# Patient Record
Sex: FEMALE
[De-identification: names, ages, dates, MRNs, and addresses within clinical notes are randomized per-mention and may not be internally consistent; named-entity substitution may affect disease eponyms.]

---

## 2020-01-27 ENCOUNTER — HOSPITAL ENCOUNTER (EMERGENCY)
Dept: HOSPITAL 5 - ED | Age: 80
Discharge: HOME | End: 2020-01-27
Payer: SELF-PAY

## 2020-01-27 VITALS — SYSTOLIC BLOOD PRESSURE: 140 MMHG | DIASTOLIC BLOOD PRESSURE: 60 MMHG

## 2020-01-27 DIAGNOSIS — Z79.899: ICD-10-CM

## 2020-01-27 DIAGNOSIS — N30.00: Primary | ICD-10-CM

## 2020-01-27 LAB
ALBUMIN SERPL-MCNC: 4.5 G/DL (ref 3.9–5)
ALT SERPL-CCNC: 16 UNITS/L (ref 7–56)
BASOPHILS # (AUTO): 0.1 K/MM3 (ref 0–0.1)
BASOPHILS NFR BLD AUTO: 0.7 % (ref 0–1.8)
BILIRUB UR QL STRIP: (no result)
BLOOD UR QL VISUAL: (no result)
BUN SERPL-MCNC: 21 MG/DL (ref 7–17)
BUN/CREAT SERPL: 26 %
CALCIUM SERPL-MCNC: 9.9 MG/DL (ref 8.4–10.2)
EOSINOPHIL # BLD AUTO: 0.2 K/MM3 (ref 0–0.4)
EOSINOPHIL NFR BLD AUTO: 1.7 % (ref 0–4.3)
HCT VFR BLD CALC: 44.6 % (ref 30.3–42.9)
HEMOLYSIS INDEX: 7
HGB BLD-MCNC: 14.9 GM/DL (ref 10.1–14.3)
LYMPHOCYTES # BLD AUTO: 1.9 K/MM3 (ref 1.2–5.4)
LYMPHOCYTES NFR BLD AUTO: 16.7 % (ref 13.4–35)
MCHC RBC AUTO-ENTMCNC: 33 % (ref 30–34)
MCV RBC AUTO: 83 FL (ref 79–97)
MONOCYTES # (AUTO): 0.6 K/MM3 (ref 0–0.8)
MONOCYTES % (AUTO): 5 % (ref 0–7.3)
MUCOUS THREADS #/AREA URNS HPF: (no result) /HPF
PH UR STRIP: 5 [PH] (ref 5–7)
PLATELET # BLD: 227 K/MM3 (ref 140–440)
RBC # BLD AUTO: 5.37 M/MM3 (ref 3.65–5.03)
RBC #/AREA URNS HPF: 160 /HPF (ref 0–6)
UROBILINOGEN UR-MCNC: < 2 MG/DL (ref ?–2)
WBC #/AREA URNS HPF: > 182 /HPF (ref 0–6)

## 2020-01-27 PROCEDURE — 81001 URINALYSIS AUTO W/SCOPE: CPT

## 2020-01-27 PROCEDURE — 83690 ASSAY OF LIPASE: CPT

## 2020-01-27 PROCEDURE — 80053 COMPREHEN METABOLIC PANEL: CPT

## 2020-01-27 PROCEDURE — 85025 COMPLETE CBC W/AUTO DIFF WBC: CPT

## 2020-01-27 PROCEDURE — 36415 COLL VENOUS BLD VENIPUNCTURE: CPT

## 2020-01-27 RX ADMIN — PHENAZOPYRIDINE HYDROCHLORIDE ONE MG: 200 TABLET ORAL at 21:12

## 2020-01-27 NOTE — EMERGENCY DEPARTMENT REPORT
Blank Doc





- Documentation


Documentation: 





79-year-old female that presents with abdominal pain and dizziness.





This initial assessment/diagnostic orders/clinical plan/treatment(s) is/are 

subject to change based on patient's health status, clinical progression and re-

assessment by fellow clinical providers in the ED.  Further treatment and workup

at subsequent clinical providers discretion.  Patient/guardians urged not to 

elope from the ED as their condition may be serious if not clinically assessed 

and managed.  Initial orders include:


1- Patient sent to MAIN ED for further evaluation and treatment


2- labs 


3- UA

## 2020-01-27 NOTE — EMERGENCY DEPARTMENT REPORT
ED Abdominal Pain HPI





- General


Chief Complaint: Abdominal Pain


Stated Complaint: INFECTION/STOMACH PAIN


Time Seen by Provider: 01/27/20 17:46


Source: patient


Mode of arrival: Ambulatory


Limitations: No Limitations





- History of Present Illness


Initial Comments: 





Patient is a 79-year-old  female who is stating that she has some lower

abdominal pain and dysuria for the last week.  Patient is at a group home states

that the owner of the group home number to the hospital.  She denies nausea 

vomiting fevers or chills.  Patient states she walked here secondary to wanting 

to be checked out for which she believes is infection.





- Related Data


                                  Previous Rx's











 Medication  Instructions  Recorded  Last Taken  Type


 


Ciprofloxacin HCl [Ciprofloxacin 500 mg PO Q12HR #14 tab 01/27/20 Unknown Rx





TAB]    


 


Phenazopyridine [Pyridium] 200 mg PO BID #6 tab 01/27/20 Unknown Rx


 


traMADoL [Ultram] 50 mg PO Q6HR PRN #12 tablet 01/27/20 Unknown Rx











                                    Allergies











Allergy/AdvReac Type Severity Reaction Status Date / Time


 


No Known Allergies Allergy   Unverified 01/27/20 18:03














ED Review of Systems


ROS: 


Stated complaint: INFECTION/STOMACH PAIN


Other details as noted in HPI





Comment: All other systems reviewed and negative





ED Past Medical Hx





- Social History


Smoking Status: Never Smoker


Substance Use Type: None





- Medications


Home Medications: 


                                Home Medications











 Medication  Instructions  Recorded  Confirmed  Last Taken  Type


 


Ciprofloxacin HCl [Ciprofloxacin 500 mg PO Q12HR #14 tab 01/27/20  Unknown Rx





TAB]     


 


Phenazopyridine [Pyridium] 200 mg PO BID #6 tab 01/27/20  Unknown Rx


 


traMADoL [Ultram] 50 mg PO Q6HR PRN #12 tablet 01/27/20  Unknown Rx














ED Physical Exam





- General


Limitations: No Limitations


General appearance: alert, in no apparent distress





- Head


Head exam: Present: atraumatic, normocephalic





- Eye


Eye exam: Present: normal appearance





- ENT


ENT exam: Present: mucous membranes moist





- Neck


Neck exam: Present: normal inspection





- Respiratory


Respiratory exam: Present: normal lung sounds bilaterally.  Absent: respiratory 

distress, wheezes, rales, rhonchi





- Cardiovascular


Cardiovascular Exam: Present: regular rate, normal rhythm, normal heart sounds. 

 Absent: systolic murmur, diastolic murmur, rubs, gallop





- GI/Abdominal


GI/Abdominal exam: Present: soft, tenderness (suprapubic), normal bowel sounds. 

 Absent: distended, guarding, rebound, rigid





- Extremities Exam


Extremities exam: Present: normal inspection





- Back Exam


Back exam: Present: normal inspection





- Neurological Exam


Neurological exam: Present: alert, oriented X3





- Psychiatric


Psychiatric exam: Present: normal affect, normal mood





- Skin


Skin exam: Present: warm, dry, intact, normal color.  Absent: rash





ED Course





                                   Vital Signs











  01/27/20 01/27/20 01/27/20





  16:28 21:13 21:14


 


Temperature 98.6 F  


 


Pulse Rate 81 85 


 


Respiratory 16 16 16





Rate   


 


Blood Pressure 128/62  


 


Blood Pressure  140/60 





[Right]   


 


O2 Sat by Pulse 95 99 99





Oximetry   














ED Medical Decision Making





- Lab Data


Result diagrams: 


                                 01/27/20 18:14





                                 01/27/20 18:14








                                   Lab Results











  01/27/20 01/27/20 01/27/20 Range/Units





  18:14 18:14 18:33 


 


WBC  11.7 H    (4.5-11.0)  K/mm3


 


RBC  5.37 H    (3.65-5.03)  M/mm3


 


Hgb  14.9 H    (10.1-14.3)  gm/dl


 


Hct  44.6 H    (30.3-42.9)  %


 


MCV  83    (79-97)  fl


 


MCH  28    (28-32)  pg


 


MCHC  33    (30-34)  %


 


RDW  13.9    (13.2-15.2)  %


 


Plt Count  227    (140-440)  K/mm3


 


Lymph % (Auto)  16.7    (13.4-35.0)  %


 


Mono % (Auto)  5.0    (0.0-7.3)  %


 


Eos % (Auto)  1.7    (0.0-4.3)  %


 


Baso % (Auto)  0.7    (0.0-1.8)  %


 


Lymph #  1.9    (1.2-5.4)  K/mm3


 


Mono #  0.6    (0.0-0.8)  K/mm3


 


Eos #  0.2    (0.0-0.4)  K/mm3


 


Baso #  0.1    (0.0-0.1)  K/mm3


 


Seg Neutrophils %  75.9 H    (40.0-70.0)  %


 


Seg Neutrophils #  8.9 H    (1.8-7.7)  K/mm3


 


Sodium   139   (137-145)  mmol/L


 


Potassium   3.7   (3.6-5.0)  mmol/L


 


Chloride   100.3   ()  mmol/L


 


Carbon Dioxide   25   (22-30)  mmol/L


 


Anion Gap   17   mmol/L


 


BUN   21 H   (7-17)  mg/dL


 


Creatinine   0.8   (0.7-1.2)  mg/dL


 


Estimated GFR   > 60   ml/min


 


BUN/Creatinine Ratio   26   %


 


Glucose   214 H   ()  mg/dL


 


Calcium   9.9   (8.4-10.2)  mg/dL


 


Total Bilirubin   0.30   (0.1-1.2)  mg/dL


 


AST   25   (5-40)  units/L


 


ALT   16   (7-56)  units/L


 


Alkaline Phosphatase   87   ()  units/L


 


Total Protein   7.4   (6.3-8.2)  g/dL


 


Albumin   4.5   (3.9-5)  g/dL


 


Albumin/Globulin Ratio   1.6   %


 


Lipase   34   (13-60)  units/L


 


Urine Color    Yellow  (Yellow)  


 


Urine Turbidity    Cloudy  (Clear)  


 


Urine pH    5.0  (5.0-7.0)  


 


Ur Specific Gravity    1.018  (1.003-1.030)  


 


Urine Protein    30 mg/dl  (Negative)  mg/dL


 


Urine Glucose (UA)    Neg  (Negative)  mg/dL


 


Urine Ketones    Neg  (Negative)  mg/dL


 


Urine Blood    Lg  (Negative)  


 


Urine Nitrite    Neg  (Negative)  


 


Urine Bilirubin    Neg  (Negative)  


 


Urine Urobilinogen    < 2.0  (<2.0)  mg/dL


 


Ur Leukocyte Esterase    Lg  (Negative)  


 


Urine WBC (Auto)    > 182.0 H  (0.0-6.0)  /HPF


 


Urine RBC (Auto)    160.0  (0.0-6.0)  /HPF


 


Urine Mucus    Few  /HPF


 


Urine Yeast (Budding)    2+  /HPF














- Medical Decision Making





Patient is a 79-year-old  female who is presenting with hematuria and 

suprapubic pain.  Patient also states she has some mild blood in the urine as 

well.  Patient does show evidence of acute cystitis.  Patient be started on 

Cipro as well as a medication for symptomatic relief and should be discharged 

home.


Critical care attestation.: 


If time is entered above; I have spent that time in minutes in the direct care 

of this critically ill patient, excluding procedure time.








ED Disposition


Clinical Impression: 


Acute cystitis


Qualifiers:


 Hematuria presence: with hematuria Qualified Code(s): N30.01 - Acute cystitis 

with hematuria





Disposition: DC-01 TO HOME OR SELFCARE


Is pt being admited?: No


Does the pt Need Aspirin: No


Condition: Stable


Instructions:  Urinary Tract Infection in Women (ED)


Referrals: 


LUCIAN FUENTES MD [Staff Physician] - 3-5 Days


Time of Disposition: 21:20

## 2020-02-06 ENCOUNTER — HOSPITAL ENCOUNTER (EMERGENCY)
Dept: HOSPITAL 5 - ED | Age: 80
LOS: 1 days | Discharge: LEFT BEFORE DISCHARGE | End: 2020-02-07
Payer: MEDICARE

## 2020-02-06 DIAGNOSIS — M79.7: Primary | ICD-10-CM

## 2020-02-07 VITALS — SYSTOLIC BLOOD PRESSURE: 149 MMHG | DIASTOLIC BLOOD PRESSURE: 83 MMHG

## 2020-02-07 NOTE — EMERGENCY DEPARTMENT REPORT
Chief Complaint: Weakness


Stated Complaint: BODY PAIN


Time Seen by Provider: 02/07/20 00:32





- HPI


History of Present Illness: 





Ms. Kaye is a 79-year-old female with history of fibromyalgia for the last 5 

years which has gotten worse over the past year.  She desires medication for 

diffuse body pain and sleep.  She attempted to see a new PCP today.  However she

was unsuccessful.  She currently is not taking any pain medication.  She 

received recent prescription here June 27 for tramadol.





- Exam


Vital Signs: 


                                   Vital Signs











  02/06/20





  21:10


 


Temperature 97.8 F


 


Pulse Rate 83


 


Respiratory 14





Rate 


 


Blood Pressure 163/78


 


O2 Sat by Pulse 96





Oximetry 











Physical Exam: 





Ms Kaye appears well she is lively talkative and smiling.  She appears 

comfortable





She moves and walks briskly


MSE screening note: 


Focused history and physical exam performed.


Due to findings the following was ordered:











ED Medical Decision Making





- Medical Decision Making





Mrs. Kaye presents with request for pain medication for fibromyalgia.  I have 

provided a referral to outpatient medicine physician.  I have attempt to explain

that nonemergent conditions will be referred to outpatient medicine physician.





ED Disposition for MSE


Clinical Impression: 


 Fibromyalgia





Disposition: Z-07 MED SCREENING EXAM-LEFT


Is pt being admited?: No


Does the pt Need Aspirin: No


Condition: Stable


Additional Instructions: 


Please see our outpatient medicine physician in order to have your condition 

adequately managed.


Referrals: 


LUCIAN FUENTES MD [Staff Physician] - 3-5 Days